# Patient Record
Sex: MALE | URBAN - METROPOLITAN AREA
[De-identification: names, ages, dates, MRNs, and addresses within clinical notes are randomized per-mention and may not be internally consistent; named-entity substitution may affect disease eponyms.]

---
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
## 2021-09-04 ENCOUNTER — TRANSCRIPTION ENCOUNTER (OUTPATIENT)
Age: 21
End: 2021-09-04

## 2023-04-04 ENCOUNTER — APPOINTMENT (OUTPATIENT)
Dept: ORTHOPEDIC SURGERY | Facility: CLINIC | Age: 23
End: 2023-04-04

## 2023-04-19 ENCOUNTER — APPOINTMENT (OUTPATIENT)
Dept: ORTHOPEDIC SURGERY | Facility: CLINIC | Age: 23
End: 2023-04-19
Payer: COMMERCIAL

## 2023-04-19 VITALS — BODY MASS INDEX: 28.09 KG/M2 | WEIGHT: 179 LBS | HEIGHT: 67 IN

## 2023-04-19 DIAGNOSIS — Z00.00 ENCOUNTER FOR GENERAL ADULT MEDICAL EXAMINATION W/OUT ABNORMAL FINDINGS: ICD-10-CM

## 2023-04-19 DIAGNOSIS — M79.641 PAIN IN RIGHT HAND: ICD-10-CM

## 2023-04-19 PROCEDURE — 99203 OFFICE O/P NEW LOW 30 MIN: CPT

## 2023-04-19 PROCEDURE — 73130 X-RAY EXAM OF HAND: CPT | Mod: RT

## 2023-04-19 RX ORDER — MELOXICAM 15 MG/1
15 TABLET ORAL DAILY
Qty: 30 | Refills: 2 | Status: ACTIVE | COMMUNITY
Start: 2023-04-19 | End: 1900-01-01

## 2023-04-19 NOTE — PHYSICAL EXAM
[de-identified] : Physical exam of his right hand: Negative swelling or ecchymosis.  Nontender over the distal radius or distal ulna.  Negative anatomical snuffbox tenderness.  Nontender over the first through fifth metacarpals.  No extensor lag present.  No deformity.  He can make a full fist.   strength is 5 out of 5.  His sensory and motor are intact.

## 2023-04-19 NOTE — HISTORY OF PRESENT ILLNESS
[de-identified] : Patient is a 22-year-old male here for evaluation of his right hand.  He started noticing pain of the fourth and fifth knuckles about 2 weeks ago.  He denies any new or recent trauma.  Occasionally radiates up to his elbow.  Today is a good day for him.  He denies any specific injury, however he does box.

## 2023-04-19 NOTE — DATA REVIEWED
[FreeTextEntry1] : X-rays taken in the office today of his right hand show no acute displaced fractures or bony abnormalities.

## 2023-04-19 NOTE — DISCUSSION/SUMMARY
[de-identified] : I discussed the x-rays with the patient.  I do not see any acute fractures or bony abnormalities\par I believe his pain is directly related to the boxing although there was not an acute injury.\par He agrees.  Today's a good day for him.  Recommend he refrain from boxing to see if that alleviates his symptoms.  I wrote him a prescription for meloxicam 15 mg 1 tab once a day for the inflammation.\par He will follow-up in about a month with Dr. Culp if he has continued pain.\par All questions were answered today.  He will contact the office with any questions or concerns.

## 2023-05-30 ENCOUNTER — APPOINTMENT (OUTPATIENT)
Dept: ORTHOPEDIC SURGERY | Facility: CLINIC | Age: 23
End: 2023-05-30

## 2025-03-27 ENCOUNTER — RESULT CHARGE (OUTPATIENT)
Age: 25
End: 2025-03-27

## 2025-03-27 ENCOUNTER — APPOINTMENT (OUTPATIENT)
Dept: ORTHOPEDIC SURGERY | Facility: CLINIC | Age: 25
End: 2025-03-27
Payer: COMMERCIAL

## 2025-03-27 DIAGNOSIS — M25.561 PAIN IN RIGHT KNEE: ICD-10-CM

## 2025-03-27 PROCEDURE — 99203 OFFICE O/P NEW LOW 30 MIN: CPT

## 2025-03-27 PROCEDURE — 73562 X-RAY EXAM OF KNEE 3: CPT | Mod: RT

## 2025-04-24 ENCOUNTER — APPOINTMENT (OUTPATIENT)
Dept: ORTHOPEDIC SURGERY | Facility: CLINIC | Age: 25
End: 2025-04-24